# Patient Record
Sex: MALE | Race: WHITE | NOT HISPANIC OR LATINO | URBAN - NONMETROPOLITAN AREA
[De-identification: names, ages, dates, MRNs, and addresses within clinical notes are randomized per-mention and may not be internally consistent; named-entity substitution may affect disease eponyms.]

---
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
## 2023-09-25 ENCOUNTER — HOSPITAL ENCOUNTER (OUTPATIENT)
Age: 23
End: 2023-09-25
Payer: OTHER GOVERNMENT

## 2023-09-25 DIAGNOSIS — M25.511: ICD-10-CM

## 2023-09-25 DIAGNOSIS — M75.111: Primary | ICD-10-CM

## 2023-09-25 DIAGNOSIS — M19.011: ICD-10-CM

## 2023-09-25 PROCEDURE — 73222 MRI JOINT UPR EXTREM W/DYE: CPT

## 2023-09-25 PROCEDURE — 77002 NEEDLE LOCALIZATION BY XRAY: CPT

## 2023-09-25 PROCEDURE — 23350 INJECTION FOR SHOULDER X-RAY: CPT
